# Patient Record
Sex: FEMALE | Race: WHITE | NOT HISPANIC OR LATINO | URBAN - METROPOLITAN AREA
[De-identification: names, ages, dates, MRNs, and addresses within clinical notes are randomized per-mention and may not be internally consistent; named-entity substitution may affect disease eponyms.]

---

## 2021-10-14 ENCOUNTER — OFFICE (OUTPATIENT)
Dept: URBAN - METROPOLITAN AREA CLINIC 102 | Facility: CLINIC | Age: 81
End: 2021-10-14
Payer: COMMERCIAL

## 2021-10-14 VITALS
TEMPERATURE: 97.9 F | DIASTOLIC BLOOD PRESSURE: 94 MMHG | WEIGHT: 214 LBS | HEART RATE: 79 BPM | HEIGHT: 69 IN | SYSTOLIC BLOOD PRESSURE: 159 MMHG

## 2021-10-14 DIAGNOSIS — Z86.010 PERSONAL HISTORY OF COLONIC POLYPS: ICD-10-CM

## 2021-10-14 DIAGNOSIS — Z80.0 FAMILY HISTORY OF MALIGNANT NEOPLASM OF DIGESTIVE ORGANS: ICD-10-CM

## 2021-10-14 PROCEDURE — 99204 OFFICE O/P NEW MOD 45 MIN: CPT

## 2021-10-14 NOTE — SERVICEHPINOTES
JOHN BONDS   is a   81   year old    female who is being seen in consultation at the request of   JAROD ARREDONDO   for  OV prior to colonoscopy. Her last colonoscopy was in 2016 which showed a sessile serrated adenoma, recall 3 years.  Her mother and 2 brothers had colon cancer--unsure what age they were at diagnosis but believes older. No prior genetic testing. Denies any other family hx of cancers (she had 7 siblings). GI-wise, she is asymptomatic. No changes in bowels, rectal bleeding, abd pain. Daily BMs. No cardiac or pulmonary issues.

## 2021-10-20 ENCOUNTER — OFFICE (OUTPATIENT)
Dept: URBAN - METROPOLITAN AREA CLINIC 102 | Facility: CLINIC | Age: 81
End: 2021-10-20

## 2021-10-20 PROCEDURE — 00038: CPT | Performed by: INTERNAL MEDICINE

## 2021-12-09 ENCOUNTER — ON CAMPUS - OUTPATIENT (OUTPATIENT)
Dept: URBAN - METROPOLITAN AREA HOSPITAL 37 | Facility: HOSPITAL | Age: 81
End: 2021-12-09
Payer: COMMERCIAL

## 2021-12-09 DIAGNOSIS — K63.5 POLYP OF COLON: ICD-10-CM

## 2021-12-09 DIAGNOSIS — Z86.010 PERSONAL HISTORY OF COLONIC POLYPS: ICD-10-CM

## 2021-12-09 DIAGNOSIS — D12.2 BENIGN NEOPLASM OF ASCENDING COLON: ICD-10-CM

## 2021-12-09 PROCEDURE — 45385 COLONOSCOPY W/LESION REMOVAL: CPT | Performed by: INTERNAL MEDICINE
